# Patient Record
Sex: FEMALE | Race: WHITE | NOT HISPANIC OR LATINO | URBAN - METROPOLITAN AREA
[De-identification: names, ages, dates, MRNs, and addresses within clinical notes are randomized per-mention and may not be internally consistent; named-entity substitution may affect disease eponyms.]

---

## 2022-11-14 ENCOUNTER — EMERGENCY (EMERGENCY)
Facility: HOSPITAL | Age: 72
LOS: 1 days | Discharge: ANOTHER HOSPITAL | End: 2022-11-14
Attending: EMERGENCY MEDICINE | Admitting: INTERNAL MEDICINE

## 2022-11-14 VITALS
OXYGEN SATURATION: 100 % | TEMPERATURE: 99 F | DIASTOLIC BLOOD PRESSURE: 93 MMHG | SYSTOLIC BLOOD PRESSURE: 154 MMHG | RESPIRATION RATE: 18 BRPM | HEART RATE: 98 BPM | WEIGHT: 209.44 LBS

## 2022-11-14 VITALS
HEART RATE: 100 BPM | RESPIRATION RATE: 18 BRPM | OXYGEN SATURATION: 99 % | SYSTOLIC BLOOD PRESSURE: 150 MMHG | DIASTOLIC BLOOD PRESSURE: 75 MMHG

## 2022-11-14 DIAGNOSIS — R07.89 OTHER CHEST PAIN: ICD-10-CM

## 2022-11-14 DIAGNOSIS — Z20.822 CONTACT WITH AND (SUSPECTED) EXPOSURE TO COVID-19: ICD-10-CM

## 2022-11-14 DIAGNOSIS — Z85.038 PERSONAL HISTORY OF OTHER MALIGNANT NEOPLASM OF LARGE INTESTINE: ICD-10-CM

## 2022-11-14 DIAGNOSIS — Z90.710 ACQUIRED ABSENCE OF BOTH CERVIX AND UTERUS: ICD-10-CM

## 2022-11-14 DIAGNOSIS — Z90.13 ACQUIRED ABSENCE OF BILATERAL BREASTS AND NIPPLES: ICD-10-CM

## 2022-11-14 DIAGNOSIS — Z85.3 PERSONAL HISTORY OF MALIGNANT NEOPLASM OF BREAST: ICD-10-CM

## 2022-11-14 DIAGNOSIS — Z91.041 RADIOGRAPHIC DYE ALLERGY STATUS: ICD-10-CM

## 2022-11-14 DIAGNOSIS — I21.3 ST ELEVATION (STEMI) MYOCARDIAL INFARCTION OF UNSPECIFIED SITE: ICD-10-CM

## 2022-11-14 LAB
ALBUMIN SERPL ELPH-MCNC: 3.6 G/DL — SIGNIFICANT CHANGE UP (ref 3.4–5)
ALP SERPL-CCNC: 51 U/L — SIGNIFICANT CHANGE UP (ref 40–120)
ALT FLD-CCNC: 14 U/L — SIGNIFICANT CHANGE UP (ref 12–42)
ANION GAP SERPL CALC-SCNC: 12 MMOL/L — SIGNIFICANT CHANGE UP (ref 9–16)
AST SERPL-CCNC: 23 U/L — SIGNIFICANT CHANGE UP (ref 15–37)
BASOPHILS # BLD AUTO: 0.04 K/UL — SIGNIFICANT CHANGE UP (ref 0–0.2)
BASOPHILS NFR BLD AUTO: 0.5 % — SIGNIFICANT CHANGE UP (ref 0–2)
BILIRUB SERPL-MCNC: 0.4 MG/DL — SIGNIFICANT CHANGE UP (ref 0.2–1.2)
BUN SERPL-MCNC: 18 MG/DL — SIGNIFICANT CHANGE UP (ref 7–23)
CALCIUM SERPL-MCNC: 9.8 MG/DL — SIGNIFICANT CHANGE UP (ref 8.5–10.5)
CHLORIDE SERPL-SCNC: 108 MMOL/L — SIGNIFICANT CHANGE UP (ref 96–108)
CO2 SERPL-SCNC: 24 MMOL/L — SIGNIFICANT CHANGE UP (ref 22–31)
CREAT SERPL-MCNC: 0.86 MG/DL — SIGNIFICANT CHANGE UP (ref 0.5–1.3)
EGFR: 72 ML/MIN/1.73M2 — SIGNIFICANT CHANGE UP
EOSINOPHIL # BLD AUTO: 0.03 K/UL — SIGNIFICANT CHANGE UP (ref 0–0.5)
EOSINOPHIL NFR BLD AUTO: 0.3 % — SIGNIFICANT CHANGE UP (ref 0–6)
GLUCOSE SERPL-MCNC: 129 MG/DL — HIGH (ref 70–99)
HCT VFR BLD CALC: 32.6 % — LOW (ref 34.5–45)
HGB BLD-MCNC: 10.4 G/DL — LOW (ref 11.5–15.5)
IMM GRANULOCYTES NFR BLD AUTO: 0.3 % — SIGNIFICANT CHANGE UP (ref 0–0.9)
LIDOCAIN IGE QN: 125 U/L — SIGNIFICANT CHANGE UP (ref 73–393)
LYMPHOCYTES # BLD AUTO: 1.19 K/UL — SIGNIFICANT CHANGE UP (ref 1–3.3)
LYMPHOCYTES # BLD AUTO: 13.5 % — SIGNIFICANT CHANGE UP (ref 13–44)
MAGNESIUM SERPL-MCNC: 1.9 MG/DL — SIGNIFICANT CHANGE UP (ref 1.6–2.6)
MCHC RBC-ENTMCNC: 30.1 PG — SIGNIFICANT CHANGE UP (ref 27–34)
MCHC RBC-ENTMCNC: 31.9 GM/DL — LOW (ref 32–36)
MCV RBC AUTO: 94.2 FL — SIGNIFICANT CHANGE UP (ref 80–100)
MONOCYTES # BLD AUTO: 0.73 K/UL — SIGNIFICANT CHANGE UP (ref 0–0.9)
MONOCYTES NFR BLD AUTO: 8.3 % — SIGNIFICANT CHANGE UP (ref 2–14)
NEUTROPHILS # BLD AUTO: 6.78 K/UL — SIGNIFICANT CHANGE UP (ref 1.8–7.4)
NEUTROPHILS NFR BLD AUTO: 77.1 % — HIGH (ref 43–77)
NRBC # BLD: 0 /100 WBCS — SIGNIFICANT CHANGE UP (ref 0–0)
NT-PROBNP SERPL-SCNC: 335 PG/ML — HIGH
PLATELET # BLD AUTO: 167 K/UL — SIGNIFICANT CHANGE UP (ref 150–400)
POTASSIUM SERPL-MCNC: 3.7 MMOL/L — SIGNIFICANT CHANGE UP (ref 3.5–5.3)
POTASSIUM SERPL-SCNC: 3.7 MMOL/L — SIGNIFICANT CHANGE UP (ref 3.5–5.3)
PROT SERPL-MCNC: 7.6 G/DL — SIGNIFICANT CHANGE UP (ref 6.4–8.2)
RBC # BLD: 3.46 M/UL — LOW (ref 3.8–5.2)
RBC # FLD: 13.2 % — SIGNIFICANT CHANGE UP (ref 10.3–14.5)
SARS-COV-2 RNA SPEC QL NAA+PROBE: SIGNIFICANT CHANGE UP
SODIUM SERPL-SCNC: 144 MMOL/L — SIGNIFICANT CHANGE UP (ref 132–145)
TROPONIN I, HIGH SENSITIVITY RESULT: 9 NG/L — SIGNIFICANT CHANGE UP
WBC # BLD: 8.8 K/UL — SIGNIFICANT CHANGE UP (ref 3.8–10.5)
WBC # FLD AUTO: 8.8 K/UL — SIGNIFICANT CHANGE UP (ref 3.8–10.5)

## 2022-11-14 PROCEDURE — 93010 ELECTROCARDIOGRAM REPORT: CPT

## 2022-11-14 PROCEDURE — 71045 X-RAY EXAM CHEST 1 VIEW: CPT | Mod: 26

## 2022-11-14 PROCEDURE — 99285 EMERGENCY DEPT VISIT HI MDM: CPT

## 2022-11-14 RX ORDER — ASPIRIN/CALCIUM CARB/MAGNESIUM 324 MG
324 TABLET ORAL ONCE
Refills: 0 | Status: COMPLETED | OUTPATIENT
Start: 2022-11-14 | End: 2022-11-14

## 2022-11-14 RX ORDER — TICAGRELOR 90 MG/1
180 TABLET ORAL ONCE
Refills: 0 | Status: COMPLETED | OUTPATIENT
Start: 2022-11-14 | End: 2022-11-14

## 2022-11-14 RX ORDER — HEPARIN SODIUM 5000 [USP'U]/ML
5000 INJECTION INTRAVENOUS; SUBCUTANEOUS ONCE
Refills: 0 | Status: COMPLETED | OUTPATIENT
Start: 2022-11-14 | End: 2022-11-14

## 2022-11-14 RX ADMIN — TICAGRELOR 180 MILLIGRAM(S): 90 TABLET ORAL at 14:18

## 2022-11-14 RX ADMIN — Medication 324 MILLIGRAM(S): at 14:19

## 2022-11-14 RX ADMIN — HEPARIN SODIUM 5000 UNIT(S): 5000 INJECTION INTRAVENOUS; SUBCUTANEOUS at 14:19

## 2022-11-14 NOTE — ED ADULT TRIAGE NOTE - CHIEF COMPLAINT QUOTE
Pt walked in c/o of sob, fatigue, and congestion x 3 days. Pt also complaining joint pain x 2 weeks. 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 Denies cp.

## 2022-11-14 NOTE — ED PROVIDER NOTE - CLINICAL SUMMARY MEDICAL DECISION MAKING FREE TEXT BOX
73yo F hx of colon and breast ca, in remission x10yrs, presents with 3d of nonexertional chest tightness and shortness of breath, worse this morning.  On exam afebrile, VSS, well appearing, in no acute distress.  EKG w ST elevation in aVR and ST depressions inferolaterally, which is STEMI equivalent.  Pt states she "never feels like this" regarding chest pain and SOB.  STEMI called; pt given ASA, brlinta, and heparin bolus.  Plan to transfer to , spoke w Dr. Jones.

## 2022-11-14 NOTE — ED ADULT NURSE NOTE - OBJECTIVE STATEMENT
Pt axo x3, came to er c/o of sob, fatigue, and congestion x 3 days. Pt states symptoms started around 2 weeks ago with joint pain and have progressively gotten worse. Pt has PMH od breast and colon cancer.

## 2022-11-14 NOTE — ED PROVIDER NOTE - OBJECTIVE STATEMENT
71yo F hx of breast ca s/p double mastectomy and chemo, hx of colon ca s/p resection and chemo, hx of BRCA s/p b/l oophorectomy, in remission from breast and colon ca for 10 years presents with chest tightness and shortness of breath which started 3 days ago and became worse this morning.  No exertional symptoms.  No cough or hemoptysis.  No leg swelling.

## 2022-11-14 NOTE — ED PROVIDER NOTE - NS ED ROS FT
Constitutional:  No fever, No chills, No night sweats  Eyes:  No visual changes, No discharge, No redness  ENMT:  No epistaxis, no nasal congestion, no throat pain, no difficulty swallowing  CV:  +chest pain, No palpitations, No peripheral edema  Resp:  No cough, +shortness of breath  GI:  No abdominal pain, No vomiting, No diarrhea  MSK:  No neck pain or stiffness, No joint swelling or pain, No back pain  Neuro: no loss of consciousness, no gait abnormality, no headache, no sensory deficits, and no weakness.  Skin:  No abrasions, no lesions, no rashes  Psych:  No known mental health issues

## 2024-03-16 NOTE — ED ADULT TRIAGE NOTE - NS ED TRIAGE AVPU SCALE
No. Alert-The patient is alert, awake and responds to voice. The patient is oriented to time, place, and person. The triage nurse is able to obtain subjective information.

## 2024-08-30 VITALS
RESPIRATION RATE: 18 BRPM | DIASTOLIC BLOOD PRESSURE: 82 MMHG | TEMPERATURE: 98 F | SYSTOLIC BLOOD PRESSURE: 151 MMHG | OXYGEN SATURATION: 99 % | HEART RATE: 88 BPM

## 2024-08-30 LAB
ALBUMIN SERPL ELPH-MCNC: 4 G/DL — SIGNIFICANT CHANGE UP (ref 3.4–5)
ALP SERPL-CCNC: 63 U/L — SIGNIFICANT CHANGE UP (ref 40–120)
ALT FLD-CCNC: 16 U/L — SIGNIFICANT CHANGE UP (ref 12–42)
ANION GAP SERPL CALC-SCNC: 9 MMOL/L — SIGNIFICANT CHANGE UP (ref 9–16)
APTT BLD: 33.6 SEC — SIGNIFICANT CHANGE UP (ref 24.5–35.6)
AST SERPL-CCNC: 22 U/L — SIGNIFICANT CHANGE UP (ref 15–37)
BASOPHILS # BLD AUTO: 0.03 K/UL — SIGNIFICANT CHANGE UP (ref 0–0.2)
BASOPHILS NFR BLD AUTO: 0.4 % — SIGNIFICANT CHANGE UP (ref 0–2)
BILIRUB SERPL-MCNC: 0.7 MG/DL — SIGNIFICANT CHANGE UP (ref 0.2–1.2)
BUN SERPL-MCNC: 13 MG/DL — SIGNIFICANT CHANGE UP (ref 7–23)
CALCIUM SERPL-MCNC: 9.8 MG/DL — SIGNIFICANT CHANGE UP (ref 8.5–10.5)
CHLORIDE SERPL-SCNC: 105 MMOL/L — SIGNIFICANT CHANGE UP (ref 96–108)
CO2 SERPL-SCNC: 29 MMOL/L — SIGNIFICANT CHANGE UP (ref 22–31)
CREAT SERPL-MCNC: 0.9 MG/DL — SIGNIFICANT CHANGE UP (ref 0.5–1.3)
EGFR: 68 ML/MIN/1.73M2 — SIGNIFICANT CHANGE UP
EOSINOPHIL # BLD AUTO: 0.05 K/UL — SIGNIFICANT CHANGE UP (ref 0–0.5)
EOSINOPHIL NFR BLD AUTO: 0.6 % — SIGNIFICANT CHANGE UP (ref 0–6)
GLUCOSE SERPL-MCNC: 138 MG/DL — HIGH (ref 70–99)
HCT VFR BLD CALC: 34.3 % — LOW (ref 34.5–45)
HGB BLD-MCNC: 11.1 G/DL — LOW (ref 11.5–15.5)
IMM GRANULOCYTES NFR BLD AUTO: 0.3 % — SIGNIFICANT CHANGE UP (ref 0–0.9)
INR BLD: 1.11 — SIGNIFICANT CHANGE UP (ref 0.85–1.18)
LYMPHOCYTES # BLD AUTO: 1.51 K/UL — SIGNIFICANT CHANGE UP (ref 1–3.3)
LYMPHOCYTES # BLD AUTO: 19.5 % — SIGNIFICANT CHANGE UP (ref 13–44)
MCHC RBC-ENTMCNC: 30.7 PG — SIGNIFICANT CHANGE UP (ref 27–34)
MCHC RBC-ENTMCNC: 32.4 GM/DL — SIGNIFICANT CHANGE UP (ref 32–36)
MCV RBC AUTO: 95 FL — SIGNIFICANT CHANGE UP (ref 80–100)
MONOCYTES # BLD AUTO: 0.53 K/UL — SIGNIFICANT CHANGE UP (ref 0–0.9)
MONOCYTES NFR BLD AUTO: 6.9 % — SIGNIFICANT CHANGE UP (ref 2–14)
NEUTROPHILS # BLD AUTO: 5.59 K/UL — SIGNIFICANT CHANGE UP (ref 1.8–7.4)
NEUTROPHILS NFR BLD AUTO: 72.3 % — SIGNIFICANT CHANGE UP (ref 43–77)
NRBC # BLD: 0 /100 WBCS — SIGNIFICANT CHANGE UP (ref 0–0)
PLATELET # BLD AUTO: 104 K/UL — LOW (ref 150–400)
POTASSIUM SERPL-MCNC: 3.8 MMOL/L — SIGNIFICANT CHANGE UP (ref 3.5–5.3)
POTASSIUM SERPL-SCNC: 3.8 MMOL/L — SIGNIFICANT CHANGE UP (ref 3.5–5.3)
PROT SERPL-MCNC: 7.8 G/DL — SIGNIFICANT CHANGE UP (ref 6.4–8.2)
PROTHROM AB SERPL-ACNC: 12.2 SEC — SIGNIFICANT CHANGE UP (ref 9.5–13)
RBC # BLD: 3.61 M/UL — LOW (ref 3.8–5.2)
RBC # FLD: 14.2 % — SIGNIFICANT CHANGE UP (ref 10.3–14.5)
SODIUM SERPL-SCNC: 143 MMOL/L — SIGNIFICANT CHANGE UP (ref 132–145)
WBC # BLD: 7.73 K/UL — SIGNIFICANT CHANGE UP (ref 3.8–10.5)
WBC # FLD AUTO: 7.73 K/UL — SIGNIFICANT CHANGE UP (ref 3.8–10.5)

## 2024-08-30 PROCEDURE — 71045 X-RAY EXAM CHEST 1 VIEW: CPT | Mod: 26

## 2024-08-30 PROCEDURE — 73552 X-RAY EXAM OF FEMUR 2/>: CPT | Mod: 26,RT

## 2024-08-30 PROCEDURE — 99285 EMERGENCY DEPT VISIT HI MDM: CPT

## 2024-08-30 PROCEDURE — 73590 X-RAY EXAM OF LOWER LEG: CPT | Mod: 26,RT

## 2024-08-30 PROCEDURE — 72192 CT PELVIS W/O DYE: CPT | Mod: 26,MC

## 2024-08-30 PROCEDURE — 73564 X-RAY EXAM KNEE 4 OR MORE: CPT | Mod: 26,RT

## 2024-08-30 PROCEDURE — 74176 CT ABD & PELVIS W/O CONTRAST: CPT | Mod: 26,MC

## 2024-08-30 RX ORDER — ACETAMINOPHEN 325 MG/1
975 TABLET ORAL ONCE
Refills: 0 | Status: COMPLETED | OUTPATIENT
Start: 2024-08-30 | End: 2024-08-30

## 2024-08-30 RX ADMIN — ACETAMINOPHEN 975 MILLIGRAM(S): 325 TABLET ORAL at 15:29

## 2024-08-30 NOTE — ED PROVIDER NOTE - PHYSICAL EXAMINATION
PHYSICAL EXAM:  CONSTITUTIONAL: Well appearing, awake, alert, oriented to person, place, time/situation and in no apparent distress.  HEAD: Atraumatic  EYES: Clear bilaterally, pupils equal, round and reactive to light.  ENMT: Airway patent, Nasal mucosa clear. Mouth with normal mucosa. Uvula is midline.   CARDIAC: Normal rate, regular rhythm. +S1/S2. No murmurs, rubs or gallops.  RESPIRATORY: Breathing unlabored. Breath sounds clear and equal bilaterally.  ABDOMEN:  Soft, nontender, nondistended. No rebound tenderness or guarding.  NEUROLOGICAL: Alert and oriented, no focal deficits, no motor or sensory deficits. CN2-12 intact. Sensation intact x4 extremities.  SKIN: Skin warm and dry. No evidence of rashes or lesions.  MSK: peripheral pulses intact, atrumatic and non tender upper ext, non tender lower ext.  gait: R limp

## 2024-08-30 NOTE — ED PROVIDER NOTE - PROGRESS NOTE DETAILS
Patient with right sided femoral neck fracture.  To be admitted to orthopedics service Dr. JOHAN RUCKER.  Preop labs, EKG, chest x-ray ordered for admission. Leonie Begum MD (PGY-3 EM): patient agreeable for admission. will admit patietn to [facility] CT abd w splenomegaly, known by pt from prior scans. Informed about incidental findings on CT abd but all non actionable.  OK for admission to Ortho.  EKG has some inferior ST depressions that are old and pt not having any chest pain/sob and is well appearing.

## 2024-08-30 NOTE — ED PROVIDER NOTE - ATTENDING CONTRIBUTION TO CARE
Patient presents today after a mechanical fall that happened in 7 to 10 days ago.  Since patient is having right lower extremity pain and difficulty with ambulation and has been mostly resting at home in bed.  On examination patient is tender to the right hip area and reduced range of motion due to pain.  Right knee full range of motion.    Workup shows a right femoral neck fracture.  Will need admission to orthopedics for surgical fixation.  Admission labs, EKG, chest x-ray ordered.  Also incidentally there is a finding of a possible mass on the CT pelvis, CT Abdo ordered for this.

## 2024-08-30 NOTE — ED ADULT NURSE NOTE - OBJECTIVE STATEMENT
Post op gtt instructions reviewed with patient. pt c/o r knee pain s/p fall a week ago. states she broke the fall with R hand and R knee, hasn't had any swelling or discoloration but pain with ambulation. +limping gait aided with cane. pt c/o right knee and leg pain s/p fall 10 days ago, states she broke the fall with R hand and R knee, hasn't had any swelling or discoloration but pain with ambulation. +limping gait aided with cane. pt c/o right knee and leg pain s/p fall 10 days ago, states she broke the fall with R hand and R knee, hasn't had any swelling or discoloration but pain with ambulation. +limping gait aided with cane. Denies hitting her head, nausea, vomiting, SOB, CP, dizziness, swelling of leg.

## 2024-08-30 NOTE — ED PROVIDER NOTE - CLINICAL SUMMARY MEDICAL DECISION MAKING FREE TEXT BOX
74-year-old female past medical history significant breast cancer status post double mastectomy, chemo, history of colon cancer status postresection and chemo, currently in remission for breast cancer and colon cancer presents emergency department status post mechanical fall on sidewalk x 10 days ago with complaint of right knee pain patient states she broke her fall with right hand and right knee. will get imaging to eval frx vs sprain. patient having trouble bearing weight. will need pain meds and re-eval

## 2024-08-30 NOTE — ED ADULT TRIAGE NOTE - CHIEF COMPLAINT QUOTE
pt c/o r knee pain s/p fall a week ago. states she broke the fall with R hand and R knee, hasn't had any swelling or discoloration but pain with ambulation. +limping gait aided with cane.

## 2024-08-30 NOTE — ED ADULT NURSE NOTE - NSFALLRISKINTERV_ED_ALL_ED

## 2024-08-30 NOTE — ED ADULT NURSE NOTE - CAS TRG GENERAL AIRWAY, MLM
Patent I will START or STAY ON the medications listed below when I get home from the hospital:  None

## 2024-08-30 NOTE — ED PROVIDER NOTE - OBJECTIVE STATEMENT
74-year-old female past medical history significant breast cancer status post double mastectomy, chemo, history of colon cancer status postresection and chemo, currently in remission for breast cancer and colon cancer presents emergency department status post mechanical fall on sidewalk x 10 days ago with complaint of right knee pain patient states she broke her fall with right hand and right knee.  Patient complains of pain with ambulation, limping gait. started using a cane to help w/ gait. obtained knee brace from pharmacy today. no complaint of R hand/wrist pain. no ASA/blood thinners. no HA/vision changes/ nausea. took tylenol 500mg this morning

## 2024-08-31 ENCOUNTER — INPATIENT (INPATIENT)
Facility: HOSPITAL | Age: 74
LOS: 1 days | Discharge: HOME | End: 2024-09-02
Attending: STUDENT IN AN ORGANIZED HEALTH CARE EDUCATION/TRAINING PROGRAM | Admitting: EMERGENCY MEDICINE
Payer: MEDICARE

## 2024-08-31 LAB
ADD ON TEST-SPECIMEN IN LAB: SIGNIFICANT CHANGE UP
APPEARANCE UR: CLEAR — SIGNIFICANT CHANGE UP
BILIRUB UR-MCNC: NEGATIVE — SIGNIFICANT CHANGE UP
BLD GP AB SCN SERPL QL: NEGATIVE — SIGNIFICANT CHANGE UP
COLOR SPEC: YELLOW — SIGNIFICANT CHANGE UP
DIFF PNL FLD: NEGATIVE — SIGNIFICANT CHANGE UP
GLUCOSE UR QL: NEGATIVE MG/DL — SIGNIFICANT CHANGE UP
KETONES UR-MCNC: 15 MG/DL
LEUKOCYTE ESTERASE UR-ACNC: NEGATIVE — SIGNIFICANT CHANGE UP
NITRITE UR-MCNC: NEGATIVE — SIGNIFICANT CHANGE UP
PH UR: 6 — SIGNIFICANT CHANGE UP (ref 5–8)
PROT UR-MCNC: NEGATIVE MG/DL — SIGNIFICANT CHANGE UP
RH IG SCN BLD-IMP: POSITIVE — SIGNIFICANT CHANGE UP
RH IG SCN BLD-IMP: POSITIVE — SIGNIFICANT CHANGE UP
SP GR SPEC: 1.02 — SIGNIFICANT CHANGE UP (ref 1–1.03)
UROBILINOGEN FLD QL: 1 MG/DL — SIGNIFICANT CHANGE UP (ref 0.2–1)

## 2024-08-31 PROCEDURE — 73501 X-RAY EXAM HIP UNI 1 VIEW: CPT | Mod: 26,RT

## 2024-08-31 DEVICE — SCREW ASNS 6.5X75 MM: Type: IMPLANTABLE DEVICE | Status: FUNCTIONAL

## 2024-08-31 DEVICE — SCREW CANN ASNIS LLL 6.5X80MM: Type: IMPLANTABLE DEVICE | Status: FUNCTIONAL

## 2024-08-31 DEVICE — GWIRE CALIB 3.2X300MM SS: Type: IMPLANTABLE DEVICE | Status: FUNCTIONAL

## 2024-08-31 RX ORDER — OXYCODONE HYDROCHLORIDE 5 MG/1
10 TABLET ORAL EVERY 4 HOURS
Refills: 0 | Status: DISCONTINUED | OUTPATIENT
Start: 2024-08-31 | End: 2024-09-02

## 2024-08-31 RX ORDER — CHLORHEXIDINE GLUCONATE 40 MG/ML
1 SOLUTION TOPICAL EVERY 12 HOURS
Refills: 0 | Status: COMPLETED | OUTPATIENT
Start: 2024-08-31 | End: 2024-08-31

## 2024-08-31 RX ORDER — CEFAZOLIN SODIUM 2 G/100ML
2000 INJECTION, SOLUTION INTRAVENOUS EVERY 8 HOURS
Refills: 0 | Status: COMPLETED | OUTPATIENT
Start: 2024-08-31 | End: 2024-09-01

## 2024-08-31 RX ORDER — HYDROMORPHONE HYDROCHLORIDE 2 MG/1
0.5 TABLET ORAL EVERY 6 HOURS
Refills: 0 | Status: DISCONTINUED | OUTPATIENT
Start: 2024-08-31 | End: 2024-09-02

## 2024-08-31 RX ORDER — OXYCODONE HYDROCHLORIDE 5 MG/1
5 TABLET ORAL EVERY 4 HOURS
Refills: 0 | Status: DISCONTINUED | OUTPATIENT
Start: 2024-08-31 | End: 2024-09-02

## 2024-08-31 RX ORDER — ENOXAPARIN SODIUM 100 MG/ML
30 INJECTION SUBCUTANEOUS EVERY 24 HOURS
Refills: 0 | Status: DISCONTINUED | OUTPATIENT
Start: 2024-08-31 | End: 2024-09-02

## 2024-08-31 RX ORDER — SODIUM CHLORIDE 0.65 %
1 AEROSOL, SPRAY (ML) NASAL DAILY
Refills: 0 | Status: DISCONTINUED | OUTPATIENT
Start: 2024-08-31 | End: 2024-09-02

## 2024-08-31 RX ORDER — ACETAMINOPHEN 325 MG/1
650 TABLET ORAL EVERY 6 HOURS
Refills: 0 | Status: DISCONTINUED | OUTPATIENT
Start: 2024-08-31 | End: 2024-09-02

## 2024-08-31 RX ADMIN — ACETAMINOPHEN 650 MILLIGRAM(S): 325 TABLET ORAL at 20:17

## 2024-08-31 RX ADMIN — HYDROMORPHONE HYDROCHLORIDE 0.5 MILLIGRAM(S): 2 TABLET ORAL at 14:45

## 2024-08-31 RX ADMIN — Medication 60 MILLILITER(S): at 03:43

## 2024-08-31 RX ADMIN — CEFAZOLIN SODIUM 100 MILLIGRAM(S): 2 INJECTION, SOLUTION INTRAVENOUS at 19:01

## 2024-08-31 RX ADMIN — ACETAMINOPHEN 650 MILLIGRAM(S): 325 TABLET ORAL at 21:17

## 2024-08-31 RX ADMIN — Medication 60 MILLILITER(S): at 14:13

## 2024-08-31 RX ADMIN — CHLORHEXIDINE GLUCONATE 1 APPLICATION(S): 40 SOLUTION TOPICAL at 06:39

## 2024-08-31 RX ADMIN — HYDROMORPHONE HYDROCHLORIDE 0.5 MILLIGRAM(S): 2 TABLET ORAL at 14:15

## 2024-08-31 NOTE — PRE-ANESTHESIA EVALUATION ADULT - NSANTHOSAYNRD_GEN_A_CORE
No. GLENROY screening performed.  STOP BANG Legend: 0-2 = LOW Risk; 3-4 = INTERMEDIATE Risk; 5-8 = HIGH Risk

## 2024-08-31 NOTE — H&P ADULT - NSHPPHYSICALEXAM_GEN_ALL_CORE
VS: stable, reviewed per nursing documentation  General: No acute distress, resting comfortably  Neuro: Alert, oriented x 3  Psych: Normal mood & affect  HEENT: normocephalic, atraumatic   Neck: trachea midline  Respiratory: nonlabored on room air   CV: no cyanosis, no peripheral edema   GI: nontender, nondistended   Skin: Warm, dry, no rash, no lesions, no abrasions no ecchymosis   MSK: atraumatic with exception of below  RLE    -Inspection/palpation: Non tenderness to R GT, no obvious deformity    -Compartments: soft, nontender bilaterally     -ROM: Able to APROM of the hip    -Motor: TA/GS/EHL/FHL 5/5 bilateral lower extremities     -Sensation: intact to light touch bilateral lower extremities    -Pulses: 2+ DP/PT pulses bilaterally, symmetric, extremities warm and    well perfused, capillary refill brisk

## 2024-08-31 NOTE — PATIENT PROFILE ADULT - FALL HARM RISK - HARM RISK INTERVENTIONS
Assistance with ambulation/Assistance OOB with selected safe patient handling equipment/Communicate Risk of Fall with Harm to all staff/Discuss with provider need for PT consult/Monitor gait and stability/Reinforce activity limits and safety measures with patient and family/Tailored Fall Risk Interventions/Visual Cue: Yellow wristband and red socks/Bed in lowest position, wheels locked, appropriate side rails in place/Call bell, personal items and telephone in reach/Instruct patient to call for assistance before getting out of bed or chair/Non-slip footwear when patient is out of bed/[place] to call system/Physically safe environment - no spills, clutter or unnecessary equipment/Purposeful Proactive Rounding/Room/bathroom lighting operational, light cord in reach

## 2024-08-31 NOTE — H&P ADULT - ATTENDING COMMENTS
PATIENT HAS BEEN SEEN AND EXAMINED ALONG WITH THE RESIDENT STAFF.  THIS VALGUS IMPACTED FEMORAL NECK FRACTURE IS INDICATED FOR CLOSED REDUCTION AND PINNING. NON-OPERATIVE TREATMENT HAS BEEN OFFERED BUT NOT RECOMMENDED. I EXPLAINED TO THE PATIENT AND FAMILY THE RISKS BENEFITS AND ALTERNATIVES OF BOTH OPERATIVE AND NON OPERATIVE TREATMENT.     I EXPLAINED THAT THE  RISKS INCLUDE BUT ARE NOT LIMITED TO: INFECTION, BLEEDING, NEED FOR A BLOOD TRANSFUSION, DAMAGE TO THE NERVES, BLOOD VESSELS LIGAMENTS AND TENDONS IN THE AREA, NONUNION, AVASCULAR NECROSIS, MALUNION, INFECTIONS REQUIRING REVISION SURGERY. RISK OF CHRONIC PAIN. THE POSSIBILITY OF FUTURE SURGERY AND THE NEED FOR EXTENSIVE PHYSICAL THERAPY AFTERWARDS AS WELL AS THE NEED FOR CLOSE FOLLOW-UP WAS EMPHASIZED. THE RISKS OF BLOOD CLOTS AND PULMONARY EMBOLI WAS DISCUSSED ALONG WITH THE OVERALL INCREASED RISK OF MEDICAL COMPLICATIONS WHICH CAN EVEN INCLUDE DEATH.    ALL QUESTIONS ANSWERED AND CONSENT HAS BEEN OBTAINED.

## 2024-08-31 NOTE — CONSULT NOTE ADULT - SUBJECTIVE AND OBJECTIVE BOX
*****INCOMPLETE NOTE*****  74F with hx of breast cancer s/p bilateral mastectomy and multiple rounds of chemo and colon cancer s/p colon resection and chemo presenting with R femoral neck fracture s/p mechanical trip and fall 10 days ago. Denies head strike, chest pain, SOB, loss of consciousness at the time of injury. Since the fall, the pt has been having difficulty ambulating and bearing weight on her right leg. It has worsened to the point that she decided to come into the [facility] ED for evaluation where she was found to have nondisplaced R femoral neck fracture.  Denies any other medical history, and does not take any medication on a regular basis. No anticoagulation.    INTERVAL HPI/OVERNIGHT EVENTS:    SUBJECTIVE: Patient seen and examined at bedside, resting comfortably in bed, and does not appear to be in any acute distress. Patient reports    Vital Signs Last 24 Hrs  T(C): 36.7 (31 Aug 2024 02:57), Max: 36.9 (30 Aug 2024 14:54)  T(F): 98.1 (31 Aug 2024 02:57), Max: 98.4 (30 Aug 2024 14:54)  HR: 72 (31 Aug 2024 02:57) (68 - 88)  BP: 151/71 (31 Aug 2024 02:57) (133/71 - 156/73)  BP(mean): --  RR: 16 (31 Aug 2024 02:57) (16 - 18)  SpO2: 96% (31 Aug 2024 02:57) (96% - 99%)    Parameters below as of 31 Aug 2024 02:57  Patient On (Oxygen Delivery Method): room air    PHYSICAL EXAM:  General: in no acute distress  Eyes: EOMI intact bilaterally. Anicteric sclerae, moist conjunctivae  HENT: Moist mucous membranes  Neck: Trachea midline, supple  Lungs: CTA B/L. No wheezes, rales, or rhonchi  Cardiovascular: RRR. No murmurs, rubs, or gallops  Abdomen: Soft, non-tender non-distended; No rebound or guarding  Extremities: WWP, No clubbing, cyanosis or edema  Neurological: Alert and oriented  Skin: Warm and dry. No obvious rash     LABS:                        11.1   7.73  )-----------( 104      ( 30 Aug 2024 17:42 )             34.3     08-30    143  |  105  |  13  ----------------------------<  138<H>  3.8   |  29  |  0.90    Ca    9.8      30 Aug 2024 17:42    TPro  7.8  /  Alb  4.0  /  TBili  0.7  /  DBili  x   /  AST  22  /  ALT  16  /  AlkPhos  63  08-30   74F with hx of breast cancer s/p bilateral mastectomy and multiple rounds of chemo and colon cancer s/p colon resection and chemo presenting with R femoral neck fracture s/p mechanical trip and fall 10 days ago. Denies head strike, chest pain, SOB, loss of consciousness at the time of injury. Since the fall, the pt has been having difficulty ambulating and bearing weight on her right leg. It has worsened to the point that she decided to come into the [facility] ED for evaluation where she was found to have nondisplaced R femoral neck fracture.  Denies any other medical history, and does not take any medication on a regular basis.     SUBJECTIVE: Patient seen and examined at bedside, resting comfortably in bed, and does not appear to be in any acute distress. Patient explicitly denies any chest pain.  Social History: Denies smoking, alcohol. Reports marijuana use.    Vital Signs Last 24 Hrs  T(C): 36.7 (31 Aug 2024 02:57), Max: 36.9 (30 Aug 2024 14:54)  T(F): 98.1 (31 Aug 2024 02:57), Max: 98.4 (30 Aug 2024 14:54)  HR: 72 (31 Aug 2024 02:57) (68 - 88)  BP: 151/71 (31 Aug 2024 02:57) (133/71 - 156/73)  BP(mean): --  RR: 16 (31 Aug 2024 02:57) (16 - 18)  SpO2: 96% (31 Aug 2024 02:57) (96% - 99%)    Parameters below as of 31 Aug 2024 02:57  Patient On (Oxygen Delivery Method): room air    PHYSICAL EXAM:  General: in no acute distress  Eyes: EOMI intact bilaterally  HENT: Moist mucous membranes  Neck: no jvd  Lungs: CTA B/L  Cardiovascular: RRR  Abdomen: Soft, non-tender non-distended  Extremities: WWP  Neurological: no loss in sensation    LABS:                        11.1   7.73  )-----------( 104      ( 30 Aug 2024 17:42 )             34.3     08-30    143  |  105  |  13  ----------------------------<  138<H>  3.8   |  29  |  0.90    Ca    9.8      30 Aug 2024 17:42    TPro  7.8  /  Alb  4.0  /  TBili  0.7  /  DBili  x   /  AST  22  /  ALT  16  /  AlkPhos  63  08-30

## 2024-08-31 NOTE — H&P ADULT - HISTORY OF PRESENT ILLNESS
74F with hx of breast cancer s/p bilateral mastectomy and multiple rounds of chemo and colon cancer s/p colon resection and chemo presenting with R femoral neck fracture s/p mechanical trip and fall 10 days ago. Denies head strike, chest pain, SOB, loss of consciousness at the time of injury. Since the fall, the pt has been having difficulty ambulating and bearing weight on her right leg. It has worsened to the point that she decided to come into the [facility] ED for evaluation where she was found to have nondisplaced R femoral neck fracture.  Denies any other medical history, and does not take any medication on a regular basis. No anticoagulation.

## 2024-08-31 NOTE — PRE-ANESTHESIA EVALUATION ADULT - NSANTHPMHFT_GEN_ALL_CORE
73yo F with hx of breast ca and colon ca presented to ED with difficulty ambulating and pain on right hip after a mechanical fall 10 days prior. She was found to have non displaced right femoral neck fracture and comes to OR for percutaneous pinning 75yo F with hx of breast ca and colon ca presented to ED with difficulty ambulating and pain on right hip after a mechanical fall 10 days prior. She was found to have non displaced right femoral neck fracture and comes to OR for percutaneous pinning. She is baseline physically active, denies cardiac history or symptoms

## 2024-08-31 NOTE — H&P ADULT - ASSESSMENT
74F with hx of breast and colon cancer s/p resection and chemo presenting with R femoral neck fracture from a fall 10 days ago.    -Admit, preops  -Added on for R hip CRPP with Dr. Dorantes  - NPO  - Bedrest until OR, NWB RLE

## 2024-08-31 NOTE — CONSULT NOTE ADULT - ASSESSMENT
74F with hx of breast cancer s/p bilateral mastectomy and multiple rounds of chemo and colon cancer s/p colon resection and chemo presenting with R femoral neck fracture s/p mechanical trip and fall 10 days ago. Medicine consulted for pre-op.    #Preop-clearance  -EKG: normal sinus rhythm, rate 84, normal axis, non specific st depressions in II/III/AVF, st elevation in AVR/ similar to prior  -Social:  -Self-reported physical activity: no limitation walking on flat ground or stairs    Assessment:  -Mets >4  -RCRI - Class ___ risk, indicating ___% 30-day risk of death, MI, or cardiac arrest  -Estefany - ___% risk of MI or cardiac arrest within 30 days of surgery    _____ is ___-risk for ___-risk surgery.  There are no apparent contraindications for ________    Discussed with Attending Dr. Espinal. Recommendations are considered final after attending attestation. 74F with hx of breast cancer s/p bilateral mastectomy and multiple rounds of chemo and colon cancer s/p colon resection and chemo presenting with R femoral neck fracture s/p mechanical fall 10 days ago. Medicine consulted for pre-op.    #Preop-clearance  Reportedly episode of hand pain and chest tightness one year ago received an EKG and saw a cardiologist at [facility]. Reportedly exercise stress test with abnormal and received a subsequent chemical stress test. States that chemical stress test was within normal limits. Patient without any chest pain here, although with st depressions in II/III/AFK and st elevation in AVR. Likely not cardiac in etiology, although will recommend to receive collateral regarding testing.  -EKG: normal sinus rhythm, rate 84, normal axis, non specific st depressions in II/III/AVF, st elevation in AVR similar to prior  -Social: Denies tobacco, or alcohol use. Reports THC use.  -Self-reported physical activity: no limitation walking on flat ground or stairs    Assessment:  -Mets >4  -RCRI - Class I risk, indicating 3.9% 30-day risk of death, MI, or cardiac arrest  -Allison - 0.0% risk of MI or cardiac arrest within 30 days of surgery    - recommend to add on troponin T to previous labs, and troponin T to AM labs  - recommend to obtain collateral from [facility] Cardiologist for stress test, although this should not delay surgery  Patient is low-risk for intermediate-risk surgery.  There are no apparent contraindications to proceed with surgery    Discussed with Attending Dr. Espinal. Recommendations are considered final after attending attestation. 74F with hx of breast cancer s/p bilateral mastectomy and multiple rounds of chemo and colon cancer s/p colon resection and chemo presenting with R femoral neck fracture s/p mechanical fall 10 days ago. Medicine consulted for pre-op.    #Preop-clearance  Reportedly episode of hand pain and chest tightness one year ago received an EKG and saw a cardiologist at [facility]. Reportedly received exercise stress test but unable to complete secondary to arthritis. Received a subsequent chemical stress test that was reportedly normal. Patient without any chest pain here, although with st depressions in II/III/AFK and st elevation in AVR and lateral leads. Likely not cardiac in etiology, although will recommend to receive collateral regarding testing, cardiac enzymes and echo.  -EKG: normal sinus rhythm, rate 84, normal axis, non specific st depressions in II/III/AVF, st elevation in AVR similar to prior  -Social: Denies tobacco, or alcohol use. Reports THC use.  -Self-reported physical activity: no limitation walking on flat ground or stairs    Assessment:  -Mets >4  -RCRI - Class I risk, indicating 3.9% 30-day risk of death, MI, or cardiac arrest  -Allison - 0.0% risk of MI or cardiac arrest within 30 days of surgery    - recommend to add on troponin T to previous labs, and troponin T to AM labs  - obtain TTE  - recommend to obtain collateral from [facility] Cardiologist for stress test, although this should not delay surgery    If all above are normal, there are no apparent contraindications to proceed with surgery    Recommendations are considered final after attending attestation. 74F with hx of breast cancer s/p bilateral mastectomy and multiple rounds of chemo and colon cancer s/p colon resection and chemo presenting with R femoral neck fracture s/p mechanical fall 10 days ago. Medicine consulted for pre-op.    #Preop-clearance  Reportedly episode of hand pain and chest tightness one year ago received an abnormal EKG and saw a cardiologist at [facility]. Reportedly received exercise stress test but unable to complete secondary to arthritis pain. Received a subsequent chemical stress test that was reportedly normal. Patient without any chest pain here, although with st depressions in II/III/AFK and st elevation in AVR and lateral leads. Will recommend to receive collateral regarding stress testing, cardiac enzymes and echo.  -EKG: normal sinus rhythm, rate 84, normal axis, non specific st depressions in II/III/AVF, st elevation in AVR similar to prior  -Social: Denies tobacco, or alcohol use. Reports THC use.  -Self-reported physical activity: no limitation walking on flat ground or stairs    Assessment:  -Mets >4  -RCRI - Class I risk, indicating 3.9% 30-day risk of death, MI, or cardiac arrest  -Allison - 0.0% risk of MI or cardiac arrest within 30 days of surgery    - recommend to add on troponin T to previous labs, and troponin T to AM labs  - please obtain TTE  - recommend to obtain collateral from [facility] Cardiologist for stress test, although this should not delay surgery    If all above are normal, there are no apparent contraindications to proceed with surgery    Recommendations are considered final after attending attestation.

## 2024-09-01 LAB
ANION GAP SERPL CALC-SCNC: 12 MMOL/L — SIGNIFICANT CHANGE UP (ref 5–17)
BUN SERPL-MCNC: 16 MG/DL — SIGNIFICANT CHANGE UP (ref 7–23)
CALCIUM SERPL-MCNC: 9.2 MG/DL — SIGNIFICANT CHANGE UP (ref 8.4–10.5)
CHLORIDE SERPL-SCNC: 103 MMOL/L — SIGNIFICANT CHANGE UP (ref 96–108)
CO2 SERPL-SCNC: 25 MMOL/L — SIGNIFICANT CHANGE UP (ref 22–31)
CREAT SERPL-MCNC: 0.88 MG/DL — SIGNIFICANT CHANGE UP (ref 0.5–1.3)
EGFR: 69 ML/MIN/1.73M2 — SIGNIFICANT CHANGE UP
GLUCOSE SERPL-MCNC: 98 MG/DL — SIGNIFICANT CHANGE UP (ref 70–99)
HCT VFR BLD CALC: 28.6 % — LOW (ref 34.5–45)
HGB BLD-MCNC: 9.3 G/DL — LOW (ref 11.5–15.5)
MCHC RBC-ENTMCNC: 29.8 PG — SIGNIFICANT CHANGE UP (ref 27–34)
MCHC RBC-ENTMCNC: 32.5 GM/DL — SIGNIFICANT CHANGE UP (ref 32–36)
MCV RBC AUTO: 91.7 FL — SIGNIFICANT CHANGE UP (ref 80–100)
NRBC # BLD: 0 /100 WBCS — SIGNIFICANT CHANGE UP (ref 0–0)
PLATELET # BLD AUTO: 93 K/UL — LOW (ref 150–400)
POTASSIUM SERPL-MCNC: 3.8 MMOL/L — SIGNIFICANT CHANGE UP (ref 3.5–5.3)
POTASSIUM SERPL-SCNC: 3.8 MMOL/L — SIGNIFICANT CHANGE UP (ref 3.5–5.3)
RBC # BLD: 3.12 M/UL — LOW (ref 3.8–5.2)
RBC # FLD: 14.2 % — SIGNIFICANT CHANGE UP (ref 10.3–14.5)
SODIUM SERPL-SCNC: 140 MMOL/L — SIGNIFICANT CHANGE UP (ref 135–145)
TROPONIN T, HIGH SENSITIVITY RESULT: 8 NG/L — SIGNIFICANT CHANGE UP (ref 0–51)
WBC # BLD: 8.12 K/UL — SIGNIFICANT CHANGE UP (ref 3.8–10.5)
WBC # FLD AUTO: 8.12 K/UL — SIGNIFICANT CHANGE UP (ref 3.8–10.5)

## 2024-09-01 PROCEDURE — 99232 SBSQ HOSP IP/OBS MODERATE 35: CPT

## 2024-09-01 RX ADMIN — CEFAZOLIN SODIUM 100 MILLIGRAM(S): 2 INJECTION, SOLUTION INTRAVENOUS at 03:00

## 2024-09-01 RX ADMIN — ACETAMINOPHEN 650 MILLIGRAM(S): 325 TABLET ORAL at 05:04

## 2024-09-01 RX ADMIN — ACETAMINOPHEN 650 MILLIGRAM(S): 325 TABLET ORAL at 06:04

## 2024-09-01 RX ADMIN — ACETAMINOPHEN 650 MILLIGRAM(S): 325 TABLET ORAL at 16:55

## 2024-09-01 RX ADMIN — ACETAMINOPHEN 650 MILLIGRAM(S): 325 TABLET ORAL at 17:36

## 2024-09-01 RX ADMIN — ENOXAPARIN SODIUM 30 MILLIGRAM(S): 100 INJECTION SUBCUTANEOUS at 08:08

## 2024-09-01 NOTE — PHYSICAL THERAPY INITIAL EVALUATION ADULT - PERTINENT HX OF CURRENT PROBLEM, REHAB EVAL
74F with hx of breast cancer s/p bilateral mastectomy and multiple rounds of chemo and colon cancer s/p colon resection and chemo presenting with R femoral neck fracture s/p mechanical trip and fall 10 days ago. Denies head strike, chest pain, SOB, loss of consciousness at the time of injury. Since the fall, the pt has been having difficulty ambulating and bearing weight on her right leg. It has worsened to the point that she decided to come into the [facility] ED for evaluation where she was found to have nondisplaced R femoral neck fracture.  Denies any other medical history, and does not take any medication on a regular basis. No anticoagulation.

## 2024-09-01 NOTE — PHYSICAL THERAPY INITIAL EVALUATION ADULT - GAIT DEVIATIONS NOTED, PT EVAL
Pt w/ appropriate vimal/step length, steady gait, no LOB/knee buckling noted; good negotiation through hallway obstacles without gait disturbances noted.

## 2024-09-01 NOTE — PHYSICAL THERAPY INITIAL EVALUATION ADULT - ADDITIONAL COMMENTS
Pt currently resides alone in elevator apt, no NITESH. Primarily amb indep w/o AD. Despite recent fall, denied other falls within past 6 months. Indep w/ showering and dressing tasks prior to [facility] surgery.

## 2024-09-01 NOTE — PROGRESS NOTE ADULT - ASSESSMENT
74F with hx of breast cancer s/p bilateral mastectomy and multiple rounds of chemo and colon cancer s/p colon resection and chemo presenting with R femoral neck fracture s/p mechanical fall 10 days ago. Medicine consulted for pre-op.    # Post-op  - Management as per ortho team.  - Oxycodone and Tylenol for pain management.  - PT evaluation.  - Bowel regimen.  - Incentive spirometer.  - Finished Ancef.  # Breast Cancer:  - S/p bilateral mastectomy   - As per patient, no active disease.  # Anemia:  - Likely dilutional and mild insensible losses.  - Please monitor for stability.   # DVT prophylaxis:  - Lovnox.

## 2024-09-02 ENCOUNTER — TRANSCRIPTION ENCOUNTER (OUTPATIENT)
Age: 74
End: 2024-09-02

## 2024-09-02 VITALS
DIASTOLIC BLOOD PRESSURE: 50 MMHG | TEMPERATURE: 99 F | HEART RATE: 72 BPM | OXYGEN SATURATION: 97 % | SYSTOLIC BLOOD PRESSURE: 132 MMHG | RESPIRATION RATE: 16 BRPM

## 2024-09-02 LAB
ANION GAP SERPL CALC-SCNC: 12 MMOL/L — SIGNIFICANT CHANGE UP (ref 5–17)
BUN SERPL-MCNC: 20 MG/DL — SIGNIFICANT CHANGE UP (ref 7–23)
CALCIUM SERPL-MCNC: 9.1 MG/DL — SIGNIFICANT CHANGE UP (ref 8.4–10.5)
CHLORIDE SERPL-SCNC: 105 MMOL/L — SIGNIFICANT CHANGE UP (ref 96–108)
CO2 SERPL-SCNC: 26 MMOL/L — SIGNIFICANT CHANGE UP (ref 22–31)
CREAT SERPL-MCNC: 0.75 MG/DL — SIGNIFICANT CHANGE UP (ref 0.5–1.3)
EGFR: 84 ML/MIN/1.73M2 — SIGNIFICANT CHANGE UP
GLUCOSE SERPL-MCNC: 99 MG/DL — SIGNIFICANT CHANGE UP (ref 70–99)
HCT VFR BLD CALC: 29.8 % — LOW (ref 34.5–45)
HGB BLD-MCNC: 9.3 G/DL — LOW (ref 11.5–15.5)
MCHC RBC-ENTMCNC: 29.2 PG — SIGNIFICANT CHANGE UP (ref 27–34)
MCHC RBC-ENTMCNC: 31.2 GM/DL — LOW (ref 32–36)
MCV RBC AUTO: 93.4 FL — SIGNIFICANT CHANGE UP (ref 80–100)
NRBC # BLD: 0 /100 WBCS — SIGNIFICANT CHANGE UP (ref 0–0)
PLATELET # BLD AUTO: 80 K/UL — LOW (ref 150–400)
POTASSIUM SERPL-MCNC: 3.9 MMOL/L — SIGNIFICANT CHANGE UP (ref 3.5–5.3)
POTASSIUM SERPL-SCNC: 3.9 MMOL/L — SIGNIFICANT CHANGE UP (ref 3.5–5.3)
RBC # BLD: 3.19 M/UL — LOW (ref 3.8–5.2)
RBC # FLD: 14.3 % — SIGNIFICANT CHANGE UP (ref 10.3–14.5)
SODIUM SERPL-SCNC: 143 MMOL/L — SIGNIFICANT CHANGE UP (ref 135–145)
WBC # BLD: 6.97 K/UL — SIGNIFICANT CHANGE UP (ref 3.8–10.5)
WBC # FLD AUTO: 6.97 K/UL — SIGNIFICANT CHANGE UP (ref 3.8–10.5)

## 2024-09-02 PROCEDURE — 99233 SBSQ HOSP IP/OBS HIGH 50: CPT

## 2024-09-02 RX ORDER — ASPIRIN 81 MG
1 TABLET, DELAYED RELEASE (ENTERIC COATED) ORAL
Qty: 60 | Refills: 0
Start: 2024-09-02 | End: 2024-10-01

## 2024-09-02 RX ORDER — ACETAMINOPHEN 325 MG/1
2 TABLET ORAL
Qty: 56 | Refills: 0
Start: 2024-09-02 | End: 2024-09-08

## 2024-09-02 RX ORDER — ACETAMINOPHEN 325 MG/1
2 TABLET ORAL
Qty: 0 | Refills: 0 | DISCHARGE
Start: 2024-09-02

## 2024-09-02 RX ADMIN — ENOXAPARIN SODIUM 30 MILLIGRAM(S): 100 INJECTION SUBCUTANEOUS at 07:00

## 2024-09-02 RX ADMIN — ACETAMINOPHEN 650 MILLIGRAM(S): 325 TABLET ORAL at 05:01

## 2024-09-02 RX ADMIN — ACETAMINOPHEN 650 MILLIGRAM(S): 325 TABLET ORAL at 06:01

## 2024-09-02 NOTE — DISCHARGE NOTE PROVIDER - NSDCCPCAREPLAN_GEN_ALL_CORE_FT
PRINCIPAL DISCHARGE DIAGNOSIS  Diagnosis: Fracture of right hip, closed, initial encounter  Assessment and Plan of Treatment:

## 2024-09-02 NOTE — SBIRT NOTE ADULT - NSSBIRTDRGBRIEFINTDET_GEN_A_CORE
Patient reports marijuana use. Patient states she smokes marijuana to help her relax. SW provided psychoeducation on harm reduction. SW encouraged patient inform MD about her marijuana use, inquire about medical cannabis program eligibility.

## 2024-09-02 NOTE — PROGRESS NOTE ADULT - ASSESSMENT
74F with hx of breast cancer s/p bilateral mastectomy and multiple rounds of chemo and colon cancer s/p colon resection and chemo presenting with R femoral neck fracture s/p mechanical fall 10 days ago s/p R hip CRPP    # Post-op  - Management as per ortho team.  - Oxycodone and Tylenol for pain management.  - PT evaluation.  - Bowel regimen.  - Incentive spirometer.  - Finished Ancef.  # Breast Cancer:  - S/p bilateral mastectomy   - As per patient, no active disease.  # Anemia:  - Likely dilutional and mild insensible losses.  - Please monitor for stability.   # DVT prophylaxis:  - Lovnox.

## 2024-09-02 NOTE — DISCHARGE NOTE PROVIDER - NSDCFUADDAPPT_GEN_ALL_CORE_FT
Please follow up with Dr. Dorantes in one week; you may call the office to make an appointment at your earliest convenience.

## 2024-09-02 NOTE — DISCHARGE NOTE PROVIDER - NSDCQMSTROKE_NEU_ALL_CORE
No Acne Type: A milial cyst Render Number Of Lesions Treated: no Detail Level: Detailed Consent was obtained and risks were reviewed including but not limited to scarring, infection, bleeding, scabbing, incomplete removal, and allergy to anesthesia. Prep Text (Optional): Prior to removal the treatment areas were prepped in the usual fashion. Extraction Method: sterile needle Post-Care Instructions: I reviewed with the patient in detail post-care instructions. Patient is to keep the treatment areas dry overnight, and then apply bacitracin twice daily until healed. Patient may apply hydrogen peroxide soaks to remove any crusting.

## 2024-09-02 NOTE — PROGRESS NOTE ADULT - REASON FOR ADMISSION
R femoral neck fracture

## 2024-09-02 NOTE — PROGRESS NOTE ADULT - SUBJECTIVE AND OBJECTIVE BOX
Ortho Progress Note    Procedure: R Hip CRPP on 8/31  Surgeon: Dr. KAL Dorantes    Pt comfortable without complaints, pain controlled  Denies CP, SOB, N/V, numbness/tingling     Vital Signs Last 24 Hrs  T(C): 36.4 (09-01-24 @ 05:05), Max: 36.4 (09-01-24 @ 05:05)  T(F): 97.5 (09-01-24 @ 05:05), Max: 97.5 (09-01-24 @ 05:05)  HR: 72 (09-01-24 @ 05:05) (72 - 72)  BP: 123/71 (09-01-24 @ 05:05) (123/71 - 123/71)  BP(mean): --  RR: 15 (09-01-24 @ 05:05) (15 - 15)  SpO2: 96% (09-01-24 @ 05:05) (96% - 96%)    General: Pt Alert and oriented, NAD  Aquacel DSG C/D/I  Pulses: 2+ DP  Sensation: SILT grossly SPN/DPN/Saph/Naa/Tib  Motor: 5/5 TA/GS/EHL, Quad/Psoas firing limited 2/2 pain    A/P: 74yFemale s/p R Hip CRPP by Dr. KAL Dorantes on 8/31  - Stable  - Pain Control  - DVT ppx: LVX  - Post op abx: Ancef  - WBS: WBAT  - PT eval today    
Patient is a 74y old  Female who presents with a chief complaint of R femoral neck fracture (01 Sep 2024 08:01)      SUBJECTIVE / OVERNIGHT EVENTS:  Reports to feeling "great"  No pain. tolerated surgery well.  Awaiting to work with PT today.     MEDICATIONS  (STANDING):  enoxaparin Injectable 30 milliGRAM(s) SubCutaneous every 24 hours  lactated ringers. 1000 milliLiter(s) (60 mL/Hr) IV Continuous <Continuous>    MEDICATIONS  (PRN):  acetaminophen     Tablet .. 650 milliGRAM(s) Oral every 6 hours PRN Mild Pain (1 - 3)  HYDROmorphone  Injectable 0.5 milliGRAM(s) IV Push every 6 hours PRN Breakthrough pain  oxyCODONE    IR 10 milliGRAM(s) Oral every 4 hours PRN Severe Pain (7 - 10)  oxyCODONE    IR 5 milliGRAM(s) Oral every 4 hours PRN Moderate Pain (4 - 6)  sodium chloride 0.65% Nasal 1 Spray(s) Both Nostrils daily PRN Nasal Congestion      CAPILLARY BLOOD GLUCOSE        I&O's Summary    31 Aug 2024 07:01  -  01 Sep 2024 07:00  --------------------------------------------------------  IN: 1400 mL / OUT: 900 mL / NET: 500 mL        PHYSICAL EXAM:  Vital Signs Last 24 Hrs  T(C): 37.1 (01 Sep 2024 08:26), Max: 37.1 (31 Aug 2024 20:28)  T(F): 98.7 (01 Sep 2024 08:26), Max: 98.8 (31 Aug 2024 20:28)  HR: 74 (01 Sep 2024 08:26) (63 - 88)  BP: 128/56 (01 Sep 2024 08:26) (123/71 - 177/73)  BP(mean): 89 (31 Aug 2024 14:51) (89 - 129)  RR: 16 (01 Sep 2024 08:26) (15 - 57)  SpO2: 96% (01 Sep 2024 08:26) (96% - 100%)    Parameters below as of 01 Sep 2024 08:26  Patient On (Oxygen Delivery Method): room air      GENERAL: NAD, well-developed  HEAD:  Atraumatic, Normocephalic  EYES: EOMI, PERRLA, conjunctiva and sclera clear  NECK: Supple, No JVD  CHEST/LUNG: Clear to auscultation bilaterally; No wheeze  HEART: Regular rate and rhythm; No murmurs, rubs, or gallops  ABDOMEN: Soft, Nontender, Nondistended; Bowel sounds present  EXTREMITIES:  2+ Peripheral Pulses, No clubbing, cyanosis, or edema  PSYCH: AAOx3  NEUROLOGY: non-focal  SKIN: No rashes or lesions    LABS:                        9.3    8.12  )-----------( 93       ( 01 Sep 2024 07:08 )             28.6     09-01    140  |  103  |  16  ----------------------------<  98  3.8   |  25  |  0.88    Ca    9.2      01 Sep 2024 07:08    TPro  7.8  /  Alb  4.0  /  TBili  0.7  /  DBili  x   /  AST  22  /  ALT  16  /  AlkPhos  63  08-30    PT/INR - ( 30 Aug 2024 17:42 )   PT: 12.2 sec;   INR: 1.11          PTT - ( 30 Aug 2024 17:42 )  PTT:33.6 sec      Urinalysis Basic - ( 01 Sep 2024 07:08 )    Color: x / Appearance: x / SG: x / pH: x  Gluc: 98 mg/dL / Ketone: x  / Bili: x / Urobili: x   Blood: x / Protein: x / Nitrite: x   Leuk Esterase: x / RBC: x / WBC x   Sq Epi: x / Non Sq Epi: x / Bacteria: x        RADIOLOGY & ADDITIONAL TESTS:    Imaging Personally Reviewed:    Consultant(s) Notes Reviewed:      Care Discussed with Consultants/Other Providers:  
Ortho Note    Pt comfortable without complaints, pain controlled  Denies CP, SOB, N/V, numbness/tingling     Vital Signs Last 24 Hrs  T(C): 36.7 (09-02-24 @ 05:51), Max: 36.7 (09-02-24 @ 05:51)  T(F): 98 (09-02-24 @ 05:51), Max: 98 (09-02-24 @ 05:51)  HR: 70 (09-02-24 @ 05:51) (70 - 70)  BP: 125/59 (09-02-24 @ 05:51) (125/59 - 125/59)  BP(mean): --  RR: 17 (09-02-24 @ 05:51) (17 - 17)  SpO2: 97% (09-02-24 @ 05:51) (97% - 97%)  I&O's Summary    31 Aug 2024 07:01  -  01 Sep 2024 07:00  --------------------------------------------------------  IN: 1400 mL / OUT: 900 mL / NET: 500 mL    01 Sep 2024 07:01  -  02 Sep 2024 06:58  --------------------------------------------------------  IN: 480 mL / OUT: 750 mL / NET: -270 mL        General: Pt Alert and oriented, NAD  DSG C/D/I  Pulses: 2+  Sensation: SILT  Motor: 5/5 Quad/Ham/EHL/FHL/TA/GS                          9.3    6.97  )-----------( 80       ( 02 Sep 2024 06:48 )             29.8     09-01    140  |  103  |  16  ----------------------------<  98  3.8   |  25  |  0.88    Ca    9.2      01 Sep 2024 07:08        A/P: 74yFemale POD#2 s/p R hip CRPP  - Stable  - Pain Control  - DVT ppx: lovenox  - PT, WBS: WBAT  - Cleared PT  - Home this AM    Ortho Pager [phone]
Ortho Post Op Check    Procedure: R hip CRPP  Surgeon: Dr. Dorantes    Pt comfortable without complaints, pain controlled  Denies CP, SOB, N/V, numbness/tingling     Vital Signs Last 24 Hrs  T(C): --  T(F): --  HR: 76 (08-31-24 @ 14:51) (63 - 88)  BP: 127/62 (08-31-24 @ 14:51) (127/62 - 177/73)  BP(mean): 89 (08-31-24 @ 14:51) (89 - 129)  RR: 57 (08-31-24 @ 14:51) (20 - 57)  SpO2: 99% (08-31-24 @ 14:51) (99% - 100%)  I&O's Summary    31 Aug 2024 07:01  -  31 Aug 2024 16:44  --------------------------------------------------------  IN: 120 mL / OUT: 200 mL / NET: -80 mL        General: Pt Alert and oriented, NAD  DSG C/D/I - aquacel  Pulses: 2+ DP  Sensation: SILT  Motor: EHL/FHL/TA/GS 5/5                          11.1   7.73  )-----------( 104      ( 30 Aug 2024 17:42 )             34.3     08-30    143  |  105  |  13  ----------------------------<  138<H>  3.8   |  29  |  0.90    Ca    9.8      30 Aug 2024 17:42    TPro  7.8  /  Alb  4.0  /  TBili  0.7  /  DBili  x   /  AST  22  /  ALT  16  /  AlkPhos  63  08-30        A/P: 74yFemale POD#0 s/p R hip CRPP  - Stable  - Pain Control  - DVT ppx: SCDs, Lvx  - Post op abx: Ancef  - PT, WBS: WBAT  Dispo: Pending PT eval    Ortho Pager [phone]
Patient is a 74y old  Female who presents with a chief complaint of R femoral neck fracture      SUBJECTIVE / OVERNIGHT EVENTS:  No acute events overnight    MEDICATIONS  (STANDING):  enoxaparin Injectable 30 milliGRAM(s) SubCutaneous every 24 hours  lactated ringers. 1000 milliLiter(s) (60 mL/Hr) IV Continuous <Continuous>    MEDICATIONS  (PRN):  acetaminophen     Tablet .. 650 milliGRAM(s) Oral every 6 hours PRN Mild Pain (1 - 3)  HYDROmorphone  Injectable 0.5 milliGRAM(s) IV Push every 6 hours PRN Breakthrough pain  oxyCODONE    IR 5 milliGRAM(s) Oral every 4 hours PRN Moderate Pain (4 - 6)  oxyCODONE    IR 10 milliGRAM(s) Oral every 4 hours PRN Severe Pain (7 - 10)  sodium chloride 0.65% Nasal 1 Spray(s) Both Nostrils daily PRN Nasal Congestion    PHYSICAL EXAM:  Vital Signs Last 24 Hrs  T(C): 37 (02 Sep 2024 08:04), Max: 37.1 (01 Sep 2024 15:37)  T(F): 98.6 (02 Sep 2024 08:04), Max: 98.8 (01 Sep 2024 20:16)  HR: 72 (02 Sep 2024 08:04) (70 - 78)  BP: 132/50 (02 Sep 2024 08:04) (115/69 - 132/50)  BP(mean): --  RR: 16 (02 Sep 2024 08:04) (16 - 17)  SpO2: 97% (02 Sep 2024 08:04) (97% - 98%)    Parameters below as of 02 Sep 2024 08:04  Patient On (Oxygen Delivery Method): room air    GENERAL: NAD, well-developed  HEAD:  Atraumatic, Normocephalic  EYES: EOMI, PERRLA, conjunctiva and sclera clear  NECK: Supple, No JVD  CHEST/LUNG: Clear to auscultation bilaterally; No wheeze  HEART: Regular rate and rhythm; No murmurs, rubs, or gallops  ABDOMEN: Soft, Nontender, Nondistended; Bowel sounds present  EXTREMITIES:  2+ Peripheral Pulses, No clubbing, cyanosis, or edema  PSYCH: AAOx3  NEUROLOGY: non-focal  SKIN: No rashes or lesions    LABS:                        9.3    6.97  )-----------( 80       ( 02 Sep 2024 06:48 )             29.8     09-02    143  |  105  |  20  ----------------------------<  99  3.9   |  26  |  0.75    Ca    9.1      02 Sep 2024 06:48          CAPILLARY BLOOD GLUCOSE        Urinalysis Basic - ( 02 Sep 2024 06:48 )    Color: x / Appearance: x / SG: x / pH: x  Gluc: 99 mg/dL / Ketone: x  / Bili: x / Urobili: x   Blood: x / Protein: x / Nitrite: x   Leuk Esterase: x / RBC: x / WBC x   Sq Epi: x / Non Sq Epi: x / Bacteria: x          RADIOLOGY, EKG & ADDITIONAL TESTS: Reviewed.

## 2024-09-02 NOTE — DISCHARGE NOTE PROVIDER - NSDCFUADDINST_GEN_ALL_CORE_FT

## 2024-09-02 NOTE — DISCHARGE NOTE PROVIDER - CARE PROVIDERS DIRECT ADDRESSES
[email or web address] [facility], later [email or web address]

## 2024-09-02 NOTE — DISCHARGE NOTE PROVIDER - HOSPITAL COURSE
74Female transferred and admitted to [facility] from [facility] on 8/31 with right femoral neck fracture s/p fall 10 days ago.  8/31: admitted overnight, medically cleared, taken to OR for right hip closed reduction percutaneous pinning with Dr. Dorantes  9/1: Discharged fro  inpatient physical therapy  9/2: Medically ready for discharge home with no additional needs

## 2024-09-02 NOTE — DISCHARGE NOTE PROVIDER - CARE PROVIDER_API CALL
Alirio Dorantes  Orthopaedic Surgery  [address]  [place] [address]  Phone: [phone]  Fax: [phone]  Follow Up Time:

## 2024-09-02 NOTE — PROGRESS NOTE ADULT - TIME BILLING
Chart review, patient assessment, discussion and collaboration with interdisciplinary team members.
Chart review, patient assessment, discussion and collaboration with interdisciplinary team members.

## 2024-09-02 NOTE — DISCHARGE NOTE NURSING/CASE MANAGEMENT/SOCIAL WORK - PATIENT PORTAL LINK FT
You can access the FollowMyHealth Patient Portal offered by [facility] by registering at the following website: [email or web address]. By joining [email or web address] FollowMyHealth portal, you will also be able to view your health information using other applications (apps) compatible with our system.

## 2024-09-02 NOTE — DISCHARGE NOTE PROVIDER - NSDCMRMEDTOKEN_GEN_ALL_CORE_FT
acetaminophen 325 mg oral tablet: 2 tab(s) orally every 6 hours as needed for  moderate pain  aspirin 81 mg oral capsule: 1 cap(s) orally 2 times a day  Physical Therapy: R hip WBAT  Tylenol Regular Strength 325 mg oral tablet: 2 tab(s) orally every 6 hours As needed Mild Pain (1 - 3)

## 2024-09-02 NOTE — DISCHARGE NOTE NURSING/CASE MANAGEMENT/SOCIAL WORK - NSDCPEFALRISK_GEN_ALL_CORE
For information on Fall & Injury Prevention, visit: [email or web address] OR  [email or web address] OR  https://www.cdc.gov/steadi/patient.html
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
## 2024-09-09 DIAGNOSIS — Z90.13 ACQUIRED ABSENCE OF BILATERAL BREASTS AND NIPPLES: ICD-10-CM

## 2024-09-09 DIAGNOSIS — Z85.038 PERSONAL HISTORY OF OTHER MALIGNANT NEOPLASM OF LARGE INTESTINE: ICD-10-CM

## 2024-09-09 DIAGNOSIS — Z85.3 PERSONAL HISTORY OF MALIGNANT NEOPLASM OF BREAST: ICD-10-CM

## 2024-09-09 DIAGNOSIS — Z92.21 PERSONAL HISTORY OF ANTINEOPLASTIC CHEMOTHERAPY: ICD-10-CM

## 2024-09-09 DIAGNOSIS — M25.551 PAIN IN RIGHT HIP: ICD-10-CM

## 2024-09-09 DIAGNOSIS — Z90.49 ACQUIRED ABSENCE OF OTHER SPECIFIED PARTS OF DIGESTIVE TRACT: ICD-10-CM

## 2024-09-09 DIAGNOSIS — S72.001A FRACTURE OF UNSPECIFIED PART OF NECK OF RIGHT FEMUR, INITIAL ENCOUNTER FOR CLOSED FRACTURE: ICD-10-CM

## 2024-09-09 DIAGNOSIS — Z91.041 RADIOGRAPHIC DYE ALLERGY STATUS: ICD-10-CM

## 2024-09-09 DIAGNOSIS — D50.9 IRON DEFICIENCY ANEMIA, UNSPECIFIED: ICD-10-CM
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
## 2024-09-10 ENCOUNTER — APPOINTMENT (OUTPATIENT)
Dept: RADIOLOGY | Facility: CLINIC | Age: 74
End: 2024-09-10
Payer: MEDICARE

## 2024-09-10 ENCOUNTER — RESULT REVIEW (OUTPATIENT)
Age: 74
End: 2024-09-10

## 2024-09-10 ENCOUNTER — OUTPATIENT (OUTPATIENT)
Dept: OUTPATIENT SERVICES | Facility: HOSPITAL | Age: 74
LOS: 1 days | End: 2024-09-10

## 2024-09-10 PROCEDURE — 72190 X-RAY EXAM OF PELVIS: CPT | Mod: 26

## 2024-09-29 PROCEDURE — 76000 FLUOROSCOPY <1 HR PHYS/QHP: CPT

## 2024-09-29 PROCEDURE — 36415 COLL VENOUS BLD VENIPUNCTURE: CPT

## 2024-09-29 PROCEDURE — C1713: CPT

## 2024-09-29 PROCEDURE — 99285 EMERGENCY DEPT VISIT HI MDM: CPT

## 2024-09-29 PROCEDURE — 84484 ASSAY OF TROPONIN QUANT: CPT

## 2024-09-29 PROCEDURE — 85025 COMPLETE CBC W/AUTO DIFF WBC: CPT

## 2024-09-29 PROCEDURE — C9399: CPT

## 2024-09-29 PROCEDURE — 85610 PROTHROMBIN TIME: CPT

## 2024-09-29 PROCEDURE — 71045 X-RAY EXAM CHEST 1 VIEW: CPT

## 2024-09-29 PROCEDURE — C1769: CPT

## 2024-09-29 PROCEDURE — 81003 URINALYSIS AUTO W/O SCOPE: CPT

## 2024-09-29 PROCEDURE — 73564 X-RAY EXAM KNEE 4 OR MORE: CPT

## 2024-09-29 PROCEDURE — 85027 COMPLETE CBC AUTOMATED: CPT

## 2024-09-29 PROCEDURE — 86900 BLOOD TYPING SEROLOGIC ABO: CPT

## 2024-09-29 PROCEDURE — 86850 RBC ANTIBODY SCREEN: CPT

## 2024-09-29 PROCEDURE — 73501 X-RAY EXAM HIP UNI 1 VIEW: CPT

## 2024-09-29 PROCEDURE — 80053 COMPREHEN METABOLIC PANEL: CPT

## 2024-09-29 PROCEDURE — 80048 BASIC METABOLIC PNL TOTAL CA: CPT

## 2024-09-29 PROCEDURE — 97161 PT EVAL LOW COMPLEX 20 MIN: CPT

## 2024-09-29 PROCEDURE — 73590 X-RAY EXAM OF LOWER LEG: CPT

## 2024-09-29 PROCEDURE — 74176 CT ABD & PELVIS W/O CONTRAST: CPT | Mod: MC

## 2024-09-29 PROCEDURE — 85730 THROMBOPLASTIN TIME PARTIAL: CPT

## 2024-09-29 PROCEDURE — 73552 X-RAY EXAM OF FEMUR 2/>: CPT

## 2024-09-29 PROCEDURE — 72192 CT PELVIS W/O DYE: CPT | Mod: MC

## 2024-09-29 PROCEDURE — 86901 BLOOD TYPING SEROLOGIC RH(D): CPT

## 2024-10-29 ENCOUNTER — OUTPATIENT (OUTPATIENT)
Dept: OUTPATIENT SERVICES | Facility: HOSPITAL | Age: 74
LOS: 1 days | End: 2024-10-29

## 2024-10-29 ENCOUNTER — APPOINTMENT (OUTPATIENT)
Dept: RADIOLOGY | Facility: CLINIC | Age: 74
End: 2024-10-29
Payer: MEDICARE

## 2024-10-29 ENCOUNTER — RESULT REVIEW (OUTPATIENT)
Age: 74
End: 2024-10-29

## 2024-10-29 PROCEDURE — 72170 X-RAY EXAM OF PELVIS: CPT | Mod: 26

## 2025-01-13 ENCOUNTER — EMERGENCY (EMERGENCY)
Facility: HOSPITAL | Age: 75
LOS: 1 days | Discharge: HOME | End: 2025-01-13
Admitting: EMERGENCY MEDICINE
Payer: MEDICARE

## 2025-01-13 VITALS
SYSTOLIC BLOOD PRESSURE: 157 MMHG | DIASTOLIC BLOOD PRESSURE: 80 MMHG | RESPIRATION RATE: 17 BRPM | OXYGEN SATURATION: 96 %

## 2025-01-13 VITALS
SYSTOLIC BLOOD PRESSURE: 169 MMHG | WEIGHT: 95.02 LBS | RESPIRATION RATE: 18 BRPM | DIASTOLIC BLOOD PRESSURE: 73 MMHG | TEMPERATURE: 98 F | OXYGEN SATURATION: 95 % | HEART RATE: 89 BPM

## 2025-01-13 PROCEDURE — 99283 EMERGENCY DEPT VISIT LOW MDM: CPT

## 2025-01-13 RX ORDER — AMOXICILLIN/POTASSIUM CLAV 875-125 MG
875 TABLET ORAL
Qty: 14 | Refills: 0
Start: 2025-01-13 | End: 2025-01-19

## 2025-01-13 NOTE — ED PROVIDER NOTE - NS ED ROS FT
+R ear fullness and dec hearing, possible FB retained  Denies fevers, chills, nausea, vomiting, diarrhea, constipation, abdominal pain, urinary symptoms, chest pain, palpitations, shortness of breath, dyspnea on exertion, syncope/near syncope, cough/URI symptoms, headache, weakness, numbness, focal deficits, visual changes, gait or balance changes, dizziness

## 2025-01-13 NOTE — ED PROVIDER NOTE - PHYSICAL EXAMINATION
VITAL SIGNS: I have reviewed nursing notes and confirm.  CONSTITUTIONAL: Well-developed; well-nourished; in no acute distress.  SKIN: No acute rash.  HEENT: Normocephalic; atraumatic. +clear EACs herrera, L TM within normal limits, R TM has full appearance w/ mild erythema and fluid noted behind it. No tragus ttp herrera.  CARD: No extremity cyanosis. RRR, S1S2.  RESP: Speaks in full, clear sentences. CTA herrera. No adventitious BS.  EXT: Moves all extremities normally.  NEURO: Alert, oriented. Grossly unremarkable. No focal deficits. Fluent speech.   PSYCH: Cooperative, appropriate. Mood and affect wnl.

## 2025-01-13 NOTE — ED PROVIDER NOTE - OBJECTIVE STATEMENT
74-year-old female, presents to the ED concerned for possible retained foreign body in her right ear.  Patient states she was cleaning her ears with a Q-tip yesterday, however upon taking it out, she noticed that the cotton piece along the end was missing.  She is has been experiencing intermittent episodes of fullness sensation in the ear and some mild decreased hearing.  Denies distinctive pain, nausea, vomiting, headaches, dizziness, chest pain or shortness of breath.

## 2025-01-13 NOTE — ED PROVIDER NOTE - CLINICAL SUMMARY MEDICAL DECISION MAKING FREE TEXT BOX
74-year-old female, presents to the ED concerned for possible retained foreign body in her right ear.  On exam patient has a clear canal without any signs of foreign body, however her TM on the right side, is full, with fluid behind it and mildly erythematous, consistent with otitis media.  Will plan to discharge patient on Augmentin for her to take for the next week as well as to have her follow-up with ENT.  No other red flags on exam and the left ear is noted to be normal.  Will plan to discharge and patient stable as she leaves.

## 2025-01-13 NOTE — ED PROVIDER NOTE - PATIENT PORTAL LINK FT
You can access the FollowMyHealth Patient Portal offered by [facility] by registering at the following website: [email or web address]. By joining [employer]’s FollowMyHealth portal, you will also be able to view your health information using other applications (apps) compatible with our system.

## 2025-01-13 NOTE — ED PROVIDER NOTE - CARE PROVIDER_API CALL
Navin Cabral  Otolaryngology  [address]  [place] [address]  Phone: [phone]  Fax: [phone]  Follow Up Time: 1-3 Days

## 2025-01-13 NOTE — ED ADULT NURSE NOTE - NSFALLUNIVINTERV_ED_ALL_ED
Bed/Stretcher in lowest position, wheels locked, appropriate side rails in place/Call bell, personal items and telephone in reach/Instruct patient to call for assistance before getting out of bed/chair/stretcher/Non-slip footwear applied when patient is off stretcher/[place] to call system/Physically safe environment - no spills, clutter or unnecessary equipment/Purposeful proactive rounding/Room/bathroom lighting operational, light cord in reach

## 2025-01-16 ENCOUNTER — APPOINTMENT (OUTPATIENT)
Dept: OTOLARYNGOLOGY | Facility: CLINIC | Age: 75
End: 2025-01-16
Payer: MEDICARE

## 2025-01-16 ENCOUNTER — NON-APPOINTMENT (OUTPATIENT)
Age: 75
End: 2025-01-16

## 2025-01-16 VITALS
WEIGHT: 94.38 LBS | TEMPERATURE: 98.9 F | SYSTOLIC BLOOD PRESSURE: 161 MMHG | OXYGEN SATURATION: 98 % | HEART RATE: 106 BPM | HEIGHT: 60 IN | DIASTOLIC BLOOD PRESSURE: 66 MMHG | BODY MASS INDEX: 18.53 KG/M2

## 2025-01-16 DIAGNOSIS — H90.3 SENSORINEURAL HEARING LOSS, BILATERAL: ICD-10-CM

## 2025-01-16 DIAGNOSIS — Z80.9 FAMILY HISTORY OF MALIGNANT NEOPLASM, UNSPECIFIED: ICD-10-CM

## 2025-01-16 DIAGNOSIS — Z82.49 FAMILY HISTORY OF ISCHEMIC HEART DISEASE AND OTHER DISEASES OF THE CIRCULATORY SYSTEM: ICD-10-CM

## 2025-01-16 DIAGNOSIS — B02.21 POSTHERPETIC GENICULATE GANGLIONITIS: ICD-10-CM

## 2025-01-16 DIAGNOSIS — Z78.9 OTHER SPECIFIED HEALTH STATUS: ICD-10-CM

## 2025-01-16 PROBLEM — Z00.00 ENCOUNTER FOR PREVENTIVE HEALTH EXAMINATION: Status: ACTIVE | Noted: 2025-01-16

## 2025-01-16 PROCEDURE — 92550 TYMPANOMETRY & REFLEX THRESH: CPT | Mod: 52

## 2025-01-16 PROCEDURE — 92557 COMPREHENSIVE HEARING TEST: CPT

## 2025-01-16 PROCEDURE — 92504 EAR MICROSCOPY EXAMINATION: CPT

## 2025-01-16 PROCEDURE — 99204 OFFICE O/P NEW MOD 45 MIN: CPT | Mod: 25

## 2025-01-16 RX ORDER — PENICILLIN V POTASSIUM 500 MG/1
TABLET, FILM COATED ORAL
Refills: 0 | Status: ACTIVE | COMMUNITY

## 2025-01-16 RX ORDER — PREDNISONE 50 MG/1
50 TABLET ORAL
Qty: 5 | Refills: 0 | Status: ACTIVE | COMMUNITY
Start: 2025-01-16 | End: 1900-01-01

## 2025-01-16 RX ORDER — VALACYCLOVIR 1 G/1
1 TABLET, FILM COATED ORAL 3 TIMES DAILY
Qty: 30 | Refills: 0 | Status: ACTIVE | COMMUNITY
Start: 2025-01-16 | End: 1900-01-01

## 2025-01-16 RX ORDER — MUPIROCIN 20 MG/G
2 OINTMENT TOPICAL
Qty: 1 | Refills: 0 | Status: ACTIVE | COMMUNITY
Start: 2025-01-16 | End: 1900-01-01

## 2025-01-17 DIAGNOSIS — Z88.8 ALLERGY STATUS TO OTHER DRUGS, MEDICAMENTS AND BIOLOGICAL SUBSTANCES: ICD-10-CM

## 2025-01-17 DIAGNOSIS — Z01.89 ENCOUNTER FOR OTHER SPECIFIED SPECIAL EXAMINATIONS: ICD-10-CM

## 2025-01-17 DIAGNOSIS — H66.91 OTITIS MEDIA, UNSPECIFIED, RIGHT EAR: ICD-10-CM

## 2025-01-29 NOTE — PATIENT PROFILE ADULT - INTERNATIONAL TRAVEL
Recent Ferritin testing showed low ferritin, continue oral iron supplementation.  
   She states mood is improved and still does not wish to start SSRI. She has not established with psychology as she is moving out of the area soon.  
No

## 2025-02-06 ENCOUNTER — APPOINTMENT (OUTPATIENT)
Dept: OTOLARYNGOLOGY | Facility: CLINIC | Age: 75
End: 2025-02-06
Payer: MEDICARE

## 2025-02-06 VITALS
HEIGHT: 60 IN | SYSTOLIC BLOOD PRESSURE: 140 MMHG | WEIGHT: 96 LBS | HEART RATE: 102 BPM | TEMPERATURE: 98.1 F | OXYGEN SATURATION: 96 % | BODY MASS INDEX: 18.85 KG/M2 | DIASTOLIC BLOOD PRESSURE: 70 MMHG

## 2025-02-06 DIAGNOSIS — H90.3 SENSORINEURAL HEARING LOSS, BILATERAL: ICD-10-CM

## 2025-02-06 DIAGNOSIS — B02.21 POSTHERPETIC GENICULATE GANGLIONITIS: ICD-10-CM

## 2025-02-06 PROCEDURE — 92550 TYMPANOMETRY & REFLEX THRESH: CPT | Mod: 52

## 2025-02-06 PROCEDURE — 99213 OFFICE O/P EST LOW 20 MIN: CPT

## 2025-02-06 PROCEDURE — 92557 COMPREHENSIVE HEARING TEST: CPT

## (undated) DEVICE — PACK BASIC GOWN MAYO COVER

## (undated) DEVICE — PREP CHLORAPREP HI-LITE ORANGE 26ML

## (undated) DEVICE — VENODYNE/SCD SLEEVE CALF MEDIUM

## (undated) DEVICE — GLV 7 PROTEXIS (WHITE)

## (undated) DEVICE — DRILL BIT STRYKER ORTHO TRAUMA 4.9MM

## (undated) DEVICE — WARMING BLANKET UPPER ADULT